# Patient Record
Sex: FEMALE | Race: AMERICAN INDIAN OR ALASKA NATIVE
[De-identification: names, ages, dates, MRNs, and addresses within clinical notes are randomized per-mention and may not be internally consistent; named-entity substitution may affect disease eponyms.]

---

## 2017-05-12 ENCOUNTER — HOSPITAL ENCOUNTER (EMERGENCY)
Dept: HOSPITAL 5 - ED | Age: 27
Discharge: HOME | End: 2017-05-12
Payer: COMMERCIAL

## 2017-05-12 VITALS — DIASTOLIC BLOOD PRESSURE: 92 MMHG | SYSTOLIC BLOOD PRESSURE: 114 MMHG
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
## 2017-05-12 DIAGNOSIS — S46.911A: Primary | ICD-10-CM
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
## 2017-05-12 PROCEDURE — 99282 EMERGENCY DEPT VISIT SF MDM: CPT
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
## 2017-12-26 ENCOUNTER — HOSPITAL ENCOUNTER (EMERGENCY)
Dept: HOSPITAL 5 - ED | Age: 27
Discharge: LEFT BEFORE DISCHARGE | End: 2017-12-26
Payer: COMMERCIAL

## 2017-12-26 VITALS — DIASTOLIC BLOOD PRESSURE: 72 MMHG | SYSTOLIC BLOOD PRESSURE: 131 MMHG

## 2017-12-26 DIAGNOSIS — J06.9: Primary | ICD-10-CM

## 2017-12-26 DIAGNOSIS — F17.200: ICD-10-CM

## 2017-12-26 PROCEDURE — A6250 SKIN SEAL PROTECT MOISTURIZR: HCPCS

## 2017-12-26 PROCEDURE — 99282 EMERGENCY DEPT VISIT SF MDM: CPT

## 2017-12-26 NOTE — EMERGENCY DEPARTMENT REPORT
- General


Chief Complaint: Upper Respiratory Infection


Stated Complaint: COUGH/RUNNY NOSE


Time Seen by Provider: 12/26/17 20:57


Source: patient


Mode of arrival: Ambulatory


Limitations: No Limitations





- History of Present Illness


Initial Comments: 





This is a 27-year-old female nontoxic, well nourished in appearance, no acute 

signs of distress presents to the ED with c/o of productive cough, rhinorrhea, 

and nasal congestion x4 days. Patient describes productive cough as well as 

yellow mucus production.  Patient denies any chest pain, shortness of breath, 

wheezing, fever, chills, nausea, vomiting, headache or stiff neck.  Patient 

denies any recent travels or localized.  Denies any calf pain or calf 

tenderness.  Patient denies any drug allergies or past medical history.


MD Complaint: cough, rhinorrhea, nasal congestion


-: days(s) (4)


Severity: mild


Consistency: constant


Improves With: nothing


Worsens With: nothing


Associated Symptoms: denies other symptoms, rhinorrhea, nasal congestion, 

cough.  denies: fever, chills, myalgias, diaphoresis, headache, sore throat, 

stiff neck, chest pain, shortness of breath, abdominal pain, nausea, vomiting, 

diarrhea, dysuria, rash, confusion, right sweats, weight loss, epistaxis, 

hoarseness, ear pain





- Related Data


 Previous Rx's











 Medication  Instructions  Recorded  Last Taken  Type


 


Cyclobenzaprine [Flexeril] 10 mg PO TID PRN #12 tablet 05/12/17 Unknown Rx


 


Ibuprofen [Motrin] 600 mg PO Q8H PRN #25 tablet 05/12/17 Unknown Rx


 


Azithromycin [Zithromax Z-JAREN] 250 mg PO DAILY #6 tablet 12/26/17 Unknown Rx


 


Benzonatate [Tessalon Perle] 100 mg PO Q6H PRN 10 Days  capsule 12/26/17 

Unknown Rx











 Allergies











Allergy/AdvReac Type Severity Reaction Status Date / Time


 


No Known Allergies Allergy   Unverified 05/12/17 14:49














ED Review of Systems


ROS: 


Stated complaint: COUGH/RUNNY NOSE


Other details as noted in HPI





Constitutional: denies: chills, fever


Eyes: denies: eye pain, eye discharge, vision change


ENT: denies: ear pain, throat pain


Respiratory: cough.  denies: shortness of breath, wheezing


Cardiovascular: denies: chest pain, palpitations


Endocrine: no symptoms reported


Gastrointestinal: denies: abdominal pain, nausea, diarrhea


Genitourinary: denies: urgency, dysuria, discharge


Musculoskeletal: denies: back pain, joint swelling, arthralgia


Skin: denies: rash, lesions


Neurological: denies: headache, weakness, paresthesias


Psychiatric: denies: anxiety, depression


Hematological/Lymphatic: denies: easy bleeding, easy bruising





ED Past Medical Hx





- Past Medical History


Previous Medical History?: No





- Surgical History


Past Surgical History?: No





- Social History


Smoking Status: Current Every Day Smoker


Substance Use Type: Alcohol, Non Opiate Pain, Other





- Medications


Home Medications: 


 Home Medications











 Medication  Instructions  Recorded  Confirmed  Last Taken  Type


 


Cyclobenzaprine [Flexeril] 10 mg PO TID PRN #12 tablet 05/12/17  Unknown Rx


 


Ibuprofen [Motrin] 600 mg PO Q8H PRN #25 tablet 05/12/17  Unknown Rx


 


Azithromycin [Zithromax Z-JAREN] 250 mg PO DAILY #6 tablet 12/26/17  Unknown Rx


 


Benzonatate [Tessalon Perle] 100 mg PO Q6H PRN 10 Days  capsule 12/26/17  

Unknown Rx














ED Physical Exam





- General


Limitations: No Limitations


General appearance: alert, in no apparent distress





- Head


Head exam: Present: atraumatic, normocephalic





- Eye


Eye exam: Present: normal appearance, PERRL, EOMI.  Absent: scleral icterus, 

conjunctival injection, nystagmus, periorbital swelling, periorbital tenderness


Pupils: Present: normal accommodation





- ENT


ENT exam: Present: normal exam, normal orophraynx, mucous membranes moist, TM's 

normal bilaterally, normal external ear exam





- Neck


Neck exam: Present: normal inspection, full ROM.  Absent: tenderness, 

meningismus, lymphadenopathy, thyromegaly





- Respiratory


Respiratory exam: Present: normal lung sounds bilaterally.  Absent: respiratory 

distress, wheezes, rales, rhonchi, stridor, chest wall tenderness, accessory 

muscle use, decreased breath sounds, prolonged expiratory





- Cardiovascular


Cardiovascular Exam: Present: regular rate, normal rhythm, normal heart sounds.

  Absent: bradycardia, tachycardia, irregular rhythm, systolic murmur, 

diastolic murmur, rubs, gallop





- GI/Abdominal


GI/Abdominal exam: Present: soft, normal bowel sounds.  Absent: distended, 

tenderness, guarding, rebound, rigid, diminished bowel sounds





- Rectal


Rectal exam: Present: deferred





- Extremities Exam


Extremities exam: Present: normal inspection, full ROM, normal capillary 

refill.  Absent: tenderness, pedal edema, joint swelling, calf tenderness





- Back Exam


Back exam: Present: normal inspection, full ROM.  Absent: tenderness, CVA 

tenderness (R), CVA tenderness (L), muscle spasm, paraspinal tenderness, 

vertebral tenderness, rash noted





- Neurological Exam


Neurological exam: Present: alert, oriented X3, CN II-XII intact, normal gait, 

reflexes normal





- Psychiatric


Psychiatric exam: Present: normal affect, normal mood





- Skin


Skin exam: Present: warm, dry, intact, normal color.  Absent: rash





ED Course


 Vital Signs











  12/26/17





  16:00


 


Temperature 99.4 F


 


Pulse Rate 86


 


Respiratory 20





Rate 


 


Blood Pressure 131/72


 


O2 Sat by Pulse 99





Oximetry 














- Reevaluation(s)


Reevaluation #1: 





12/26/17 21:46


Patient is speaking in full sentences with no signs of distress noted.





ED Medical Decision Making





- Medical Decision Making





This is a 27-year-old female that presents with upper respiratory infection.  

Patient is stable and has examined by me.  Chest x-ray has been obtained but 

patient refused as she stated she has to leave in 10 mins. PAtient was 

instructed and educated of my concerns and further evaluation of productive 

cough she states she just wants to be treated empirically and stated if 

symptoms worsen she will return.  Patient is discharged with azithromycin and 

Tessalon Perles.  Patient signed AMA form but I will still treat patient 

empirically. Patient was instructed Follow-up with a primary care doctor in 3-5 

days or if symptoms worsen and continue return to emergency room as soon as 

possible.  At time time of signing AMA form, the patient does not seem toxic or 

ill in appearance.  No acute signs of distress noted.  Patient agrees treatment 

plan of care.  No further questions noted by the patient.


Critical care attestation.: 


If time is entered above; I have spent that time in minutes in the direct care 

of this critically ill patient, excluding procedure time.








ED Disposition


Clinical Impression: 


Upper respiratory infection


Qualifiers:


 URI type: unspecified URI Qualified Code(s): J06.9 - Acute upper respiratory 

infection, unspecified





Disposition: DC-07 LEFT AGAINST MED ADVICE


Is pt being admited?: No


Does the pt Need Aspirin: No


Condition: Stable


Instructions:  Benzonatate (By mouth), Azithromycin (By mouth), Upper 

Respiratory Infection (ED)


Additional Instructions: 


Follow-up with a primary care doctor in 3-5 days or if symptoms worsen and 

continue return to emergency room as soon as possible. 


Prescriptions: 


Azithromycin [Zithromax Z-JAREN] 250 mg PO DAILY #6 tablet


Benzonatate [Tessalon Perle] 100 mg PO Q6H PRN 10 Days  capsule


 PRN Reason: Cough


Referrals: 


PRIMARY CARE,MD [Primary Care Provider] - 3-5 Days


STELLA ALVARADO MD [Staff Physician] - 3-5 Days


[facility] [Outside] - 3-5 Days


[facility] [Outside] - 3-5 Days


Forms:  AMA Form, Work/School Release Form(ED)

## 2022-07-13 ENCOUNTER — HOSPITAL ENCOUNTER (EMERGENCY)
Dept: HOSPITAL 5 - ED | Age: 32
Discharge: LEFT BEFORE DISCHARGE | End: 2022-07-13
Payer: SELF-PAY

## 2022-07-13 DIAGNOSIS — Z53.21: ICD-10-CM

## 2022-07-13 DIAGNOSIS — R07.9: Primary | ICD-10-CM
